# Patient Record
Sex: FEMALE | Race: BLACK OR AFRICAN AMERICAN | NOT HISPANIC OR LATINO
[De-identification: names, ages, dates, MRNs, and addresses within clinical notes are randomized per-mention and may not be internally consistent; named-entity substitution may affect disease eponyms.]

---

## 2018-05-10 ENCOUNTER — APPOINTMENT (OUTPATIENT)
Dept: NEUROLOGY | Facility: CLINIC | Age: 68
End: 2018-05-10
Payer: COMMERCIAL

## 2018-05-10 DIAGNOSIS — Z87.891 PERSONAL HISTORY OF NICOTINE DEPENDENCE: ICD-10-CM

## 2018-05-10 DIAGNOSIS — Z78.9 OTHER SPECIFIED HEALTH STATUS: ICD-10-CM

## 2018-05-10 DIAGNOSIS — Z83.3 FAMILY HISTORY OF DIABETES MELLITUS: ICD-10-CM

## 2018-05-10 DIAGNOSIS — Z82.49 FAMILY HISTORY OF ISCHEMIC HEART DISEASE AND OTHER DISEASES OF THE CIRCULATORY SYSTEM: ICD-10-CM

## 2018-05-10 DIAGNOSIS — H53.8 OTHER VISUAL DISTURBANCES: ICD-10-CM

## 2018-05-10 PROCEDURE — 99204 OFFICE O/P NEW MOD 45 MIN: CPT

## 2018-05-10 RX ORDER — CHROMIUM 200 MCG
TABLET ORAL
Refills: 0 | Status: ACTIVE | COMMUNITY

## 2018-05-10 RX ORDER — DULOXETINE HYDROCHLORIDE 20 MG/1
20 CAPSULE, DELAYED RELEASE PELLETS ORAL TWICE DAILY
Refills: 0 | Status: ACTIVE | COMMUNITY

## 2018-06-06 ENCOUNTER — APPOINTMENT (OUTPATIENT)
Dept: NEUROLOGY | Facility: CLINIC | Age: 68
End: 2018-06-06
Payer: COMMERCIAL
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
## 2018-06-06 PROCEDURE — 99214 OFFICE O/P EST MOD 30 MIN: CPT

## 2018-07-10 ENCOUNTER — APPOINTMENT (OUTPATIENT)
Dept: NEUROLOGY | Facility: CLINIC | Age: 68
End: 2018-07-10
Payer: COMMERCIAL

## 2018-07-10 DIAGNOSIS — I67.9 CEREBROVASCULAR DISEASE, UNSPECIFIED: ICD-10-CM

## 2018-07-10 PROCEDURE — 99214 OFFICE O/P EST MOD 30 MIN: CPT

## 2018-12-28 ENCOUNTER — APPOINTMENT (OUTPATIENT)
Dept: NEUROLOGY | Facility: CLINIC | Age: 68
End: 2018-12-28

## 2019-01-16 ENCOUNTER — RESULT REVIEW (OUTPATIENT)
Age: 69
End: 2019-01-16

## 2019-02-05 ENCOUNTER — RX RENEWAL (OUTPATIENT)
Age: 69
End: 2019-02-05

## 2021-02-26 ENCOUNTER — RESULT REVIEW (OUTPATIENT)
Age: 71
End: 2021-02-26

## 2022-02-28 ENCOUNTER — RESULT REVIEW (OUTPATIENT)
Age: 72
End: 2022-02-28

## 2022-04-07 ENCOUNTER — TRANSCRIPTION ENCOUNTER (OUTPATIENT)
Age: 72
End: 2022-04-07

## 2022-04-27 ENCOUNTER — TRANSCRIPTION ENCOUNTER (OUTPATIENT)
Age: 72
End: 2022-04-27

## 2022-04-28 ENCOUNTER — APPOINTMENT (OUTPATIENT)
Dept: NEUROLOGY | Facility: CLINIC | Age: 72
End: 2022-04-28

## 2022-06-06 ENCOUNTER — TRANSCRIPTION ENCOUNTER (OUTPATIENT)
Age: 72
End: 2022-06-06

## 2023-02-14 ENCOUNTER — APPOINTMENT (OUTPATIENT)
Dept: NEUROLOGY | Facility: CLINIC | Age: 73
End: 2023-02-14

## 2023-02-14 ENCOUNTER — APPOINTMENT (OUTPATIENT)
Dept: NEUROLOGY | Facility: CLINIC | Age: 73
End: 2023-02-14
Payer: MEDICARE

## 2023-02-14 VITALS
BODY MASS INDEX: 30.37 KG/M2 | TEMPERATURE: 98 F | WEIGHT: 189 LBS | SYSTOLIC BLOOD PRESSURE: 130 MMHG | DIASTOLIC BLOOD PRESSURE: 82 MMHG | HEIGHT: 66 IN | OXYGEN SATURATION: 96 % | HEART RATE: 118 BPM

## 2023-02-14 PROCEDURE — 99215 OFFICE O/P EST HI 40 MIN: CPT

## 2023-02-14 RX ORDER — ASPIRIN ENTERIC COATED TABLETS 81 MG 81 MG/1
81 TABLET, DELAYED RELEASE ORAL DAILY
Qty: 30 | Refills: 4 | Status: DISCONTINUED | COMMUNITY
Start: 2018-06-06 | End: 2023-02-14

## 2023-02-14 RX ORDER — DIAZEPAM 5 MG/1
5 TABLET ORAL
Qty: 2 | Refills: 0 | Status: DISCONTINUED | COMMUNITY
Start: 2018-05-10 | End: 2023-02-14

## 2023-02-14 NOTE — DATA REVIEWED
[de-identified] : MRI head 4/6/22\par  Left frontal 4.3 cm subacute to chronic intraparenchymal\par hemorrhage with mild intraventricular hemorrhage; findings may be the result\par of infarction, hypertension, underlying hemorrhagic neoplasm, amyloid, blood\par dyscrasia, vascular malformation. Recommend follow-up imaging to resolution.\par No evidence of abnormal flow-voids. No evidence of abnormal enhancement. Mild\par microvascular disease. No evidence of acute infarction. [de-identified] : \par CT head 4/5/22\par Acute left frontal intraparenchymal hemorrhage with intraventricular\par extension, as described above, including approximately 6 mm of rightward\par midline shift.\par \par CTA head and neck 4/5/22;\par Calcified/noncalcified atherosclerotic plaque of the bilateral carotid\par bifurcations and bilateral proximal internal carotid arteries, contributing to\par less than 50% stenosis as per NASCET criteria.\par \par No hemodynamically significant stenosis or dissection identified in proximal\par intracranial and neck vasculature.\par No saccular intracranial aneurysm identified.

## 2023-02-14 NOTE — HISTORY OF PRESENT ILLNESS
[FreeTextEntry1] : Pt is 72 yo RH F with hx of HTN, prior ICH here to have re-established care.\par \par Pt seen in the office. Prior chart reviewed\par \par Pt seen at [facility] in 4/6/22: At the time, pt presented to [facility] ER after family noted several days of slow mentation, confusion, and decreased speech (speaking few words).  Pt also noted with increased sleep which prompted the ER visit. Subsequent ct head showed Left frontal ICH. Pt admitted for monitoring.\par \par Pt has repeat imaging showing stable bleed. MRI head did not show any underlying lesion (study limited due to bleed).  Family denied any recent fall/head trauma, no prior hx of dementia.\par \par Pt on exam was lethargic but awake and alert, following commands. Subtle right side weakness noted, with initial NIHSS of 1.\par \par Pt subsequently discharged to rehab.\par \par 2/14/23: Pt seen in the office along with pt's grandson (who she lives with). Pt apparently was followed by neurologist in the area: pt's  took care of the appts, but unfortunately he passed away, and now, family is trying to re-establish care. As per pt and grandson, pt apparently had repeat imaging as outpt and was told that it was stable. Currently, pt with subtle right hand weakness, otherwise, intact. Pt wants to know if she can start driving again.  No HA, no blurry vision, no nausea.\par \par Soc Hx: no smoking, no etoh\par Fam Hx: no stroke\par \par

## 2023-02-14 NOTE — ASSESSMENT
[FreeTextEntry1] : Pt is 74 yo RH F with hx of HTN, prior ICH here to have re-established care.\par \par - Pt doing well, possibly subtle right hand weakness but otherwise intact\par - bp goal: normotensive\par - as per pt/family, pt had serial imaging (including MRI ) as outpt: pt requested to provide report/cd of the image for evaluation. If not done recently (i.e., since April 2022), will do MRI head c+/c- to evaluate the status of the bleed, and to evaluate any underlying pathology for the bleed (amyloid, mass)\par - no report of seizures: will hold off on seizure meds\par - pt advised no driving until recent outpt head image (or new image) is provided for review\par - will follow up after images are reviewed\par \par

## 2023-03-27 ENCOUNTER — APPOINTMENT (OUTPATIENT)
Dept: NEUROLOGY | Facility: CLINIC | Age: 73
End: 2023-03-27
Payer: MEDICARE

## 2023-03-27 VITALS
TEMPERATURE: 97.3 F | SYSTOLIC BLOOD PRESSURE: 133 MMHG | DIASTOLIC BLOOD PRESSURE: 84 MMHG | BODY MASS INDEX: 28.93 KG/M2 | HEIGHT: 66 IN | OXYGEN SATURATION: 100 % | WEIGHT: 180 LBS | HEART RATE: 87 BPM

## 2023-03-27 DIAGNOSIS — I61.9 NONTRAUMATIC INTRACEREBRAL HEMORRHAGE, UNSPECIFIED: ICD-10-CM

## 2023-03-27 DIAGNOSIS — R51.9 HEADACHE, UNSPECIFIED: ICD-10-CM

## 2023-03-27 PROCEDURE — 99214 OFFICE O/P EST MOD 30 MIN: CPT

## 2023-03-27 RX ORDER — TOPIRAMATE 25 MG/1
25 TABLET, FILM COATED ORAL
Qty: 30 | Refills: 4 | Status: DISCONTINUED | COMMUNITY
Start: 2018-06-06 | End: 2023-03-27

## 2023-03-27 RX ORDER — ESCITALOPRAM OXALATE 10 MG/1
10 TABLET ORAL DAILY
Refills: 0 | Status: DISCONTINUED | COMMUNITY
End: 2023-03-27

## 2023-03-30 ENCOUNTER — RESULT REVIEW (OUTPATIENT)
Age: 73
End: 2023-03-30

## 2023-04-07 NOTE — ADDENDUM
[FreeTextEntry1] : updated pt on recent ct head results over the phone (no sig finding). Pt doing well with limited headache. Suggested to cont prn pain meds and warm compress since symptom suggest more of tension headache. Pt with minimal deficits on exam at prev visit: pt wanted to resume driving. At this time, pt suggested to slowly go back to driving (local streets, day light) . follow up in 6 months.

## 2023-04-07 NOTE — PHYSICAL EXAM
[FreeTextEntry1] : Neuro Exam\par MS: AAOx3, follows commands, good comprehension, fund of knowledge appears intact, no aphasia, no dysarthria\par CN:  PERRL, EOMI, peripheral vision intact, v1-v3 intact, hearing intact, no facial asymmetry, tongue & uvula midline, shoulder shrug equal strength\par Motor:  5/5 , very subtle right side drift (old), no rigidity, no abnormal atrophy\par Sensory: Lt/PP intact\par Coord: no tremors\par DTR: 0\par \par

## 2023-04-07 NOTE — HISTORY OF PRESENT ILLNESS
[FreeTextEntry1] : Pt is 74 yo RH F with hx of HTN, prior ICH here to have re-established care.\par \par Pt seen in the office. Prior chart reviewed\par \par Pt seen at [facility] in 4/6/22: At the time, pt presented to [facility] ER after family noted several days of slow mentation, confusion, and decreased speech (speaking few words).  Pt also noted with increased sleep which prompted the ER visit. Subsequent ct head showed Left frontal ICH. Pt admitted for monitoring.\par \par Pt has repeat imaging showing stable bleed. MRI head did not show any underlying lesion (study limited due to bleed).  Family denied any recent fall/head trauma, no prior hx of dementia.\par \par Pt on exam was lethargic but awake and alert, following commands. Subtle right side weakness noted, with initial NIHSS of 1.\par \par Pt subsequently discharged to rehab.\par \par 2/14/23: Pt seen in the office along with pt's grandson (who she lives with). Pt apparently was followed by neurologist in the area: pt's  took care of the appts, but unfortunately he passed away, and now, family is trying to re-establish care. As per pt and grandson, pt apparently had repeat imaging as outpt and was told that it was stable. Currently, pt with subtle right hand weakness, otherwise, intact. Pt wants to know if she can start driving again.  No HA, no blurry vision, no nausea.\par \par 3/27/23  here for follow up complain of headaches.  2 weeks ago with sudden headache, lasted for 1 day. resolved tyelenol.  Second episode last week, sudden onset 9/10 pain , across the forehead. lasted for about 1 day despite taking tyelenol.   checked bp at that point: was fine.  No HA today.\par \par \par \par \par  \par

## 2023-04-07 NOTE — ASSESSMENT
[FreeTextEntry1] : Pt is 72 yo RH F with prior hx of ICH (left frontal, Apr 2022, subtle right side drift). Here for recurrent headache.\par \par - As per pt, headache is band like distribution across the forehead, sudden at onset, usu resolving after 1 day. Given hx, will obtain repeat ct head to evaluate any new bleed\par - if neg, recurrent symptom and distribution suggest possible tension headache: pt suggested prn nsaids.\par - suggested to the pt to come to ER/urgent care for sudden onset of headache\par -follow up prn/ will call with ct head results.

## 2023-04-07 NOTE — DATA REVIEWED
[de-identified] : MRI head 4/6/22\par  Left frontal 4.3 cm subacute to chronic intraparenchymal\par hemorrhage with mild intraventricular hemorrhage; findings may be the result\par of infarction, hypertension, underlying hemorrhagic neoplasm, amyloid, blood\par dyscrasia, vascular malformation. Recommend follow-up imaging to resolution.\par No evidence of abnormal flow-voids. No evidence of abnormal enhancement. Mild\par microvascular disease. No evidence of acute infarction. [de-identified] : \par CT head 4/5/22\par Acute left frontal intraparenchymal hemorrhage with intraventricular\par extension, as described above, including approximately 6 mm of rightward\par midline shift.\par \par CTA head and neck 4/5/22;\par Calcified/noncalcified atherosclerotic plaque of the bilateral carotid\par bifurcations and bilateral proximal internal carotid arteries, contributing to\par less than 50% stenosis as per NASCET criteria.\par \par No hemodynamically significant stenosis or dissection identified in proximal\par intracranial and neck vasculature.\par No saccular intracranial aneurysm identified.\par \par ct head 6/2/23\par No acute intracranial hemorrhage or interval demarcated. Continued evolution\par with decreased size in mass effect of the left frontal lobe hematoma..\par